# Patient Record
Sex: FEMALE | Race: ASIAN | NOT HISPANIC OR LATINO | URBAN - METROPOLITAN AREA
[De-identification: names, ages, dates, MRNs, and addresses within clinical notes are randomized per-mention and may not be internally consistent; named-entity substitution may affect disease eponyms.]

---

## 2024-09-08 ENCOUNTER — EMERGENCY (EMERGENCY)
Facility: HOSPITAL | Age: 54
LOS: 1 days | Discharge: HOME | End: 2024-09-08
Attending: STUDENT IN AN ORGANIZED HEALTH CARE EDUCATION/TRAINING PROGRAM
Payer: COMMERCIAL

## 2024-09-08 VITALS
WEIGHT: 147.71 LBS | DIASTOLIC BLOOD PRESSURE: 78 MMHG | RESPIRATION RATE: 17 BRPM | HEART RATE: 67 BPM | TEMPERATURE: 98 F | HEIGHT: 61.42 IN | SYSTOLIC BLOOD PRESSURE: 151 MMHG | OXYGEN SATURATION: 98 %

## 2024-09-08 VITALS
TEMPERATURE: 98 F | RESPIRATION RATE: 16 BRPM | SYSTOLIC BLOOD PRESSURE: 114 MMHG | HEART RATE: 59 BPM | DIASTOLIC BLOOD PRESSURE: 56 MMHG | OXYGEN SATURATION: 96 %

## 2024-09-08 LAB
ALBUMIN SERPL ELPH-MCNC: 4.3 G/DL — SIGNIFICANT CHANGE UP (ref 3.3–5)
ALP SERPL-CCNC: 102 U/L — SIGNIFICANT CHANGE UP (ref 40–120)
ALT FLD-CCNC: 36 U/L — SIGNIFICANT CHANGE UP (ref 10–45)
ANION GAP SERPL CALC-SCNC: 13 MMOL/L — SIGNIFICANT CHANGE UP (ref 5–17)
APTT BLD: 40.2 SEC — HIGH (ref 24.5–35.6)
AST SERPL-CCNC: 24 U/L — SIGNIFICANT CHANGE UP (ref 10–40)
BASOPHILS # BLD AUTO: 0.05 K/UL — SIGNIFICANT CHANGE UP (ref 0–0.2)
BASOPHILS NFR BLD AUTO: 0.4 % — SIGNIFICANT CHANGE UP (ref 0–2)
BILIRUB SERPL-MCNC: 1.5 MG/DL — HIGH (ref 0.2–1.2)
BUN SERPL-MCNC: 12 MG/DL — SIGNIFICANT CHANGE UP (ref 7–23)
CALCIUM SERPL-MCNC: 9.8 MG/DL — SIGNIFICANT CHANGE UP (ref 8.4–10.5)
CHLORIDE SERPL-SCNC: 104 MMOL/L — SIGNIFICANT CHANGE UP (ref 96–108)
CO2 SERPL-SCNC: 24 MMOL/L — SIGNIFICANT CHANGE UP (ref 22–31)
CREAT SERPL-MCNC: 0.61 MG/DL — SIGNIFICANT CHANGE UP (ref 0.5–1.3)
EGFR: 106 ML/MIN/1.73M2 — SIGNIFICANT CHANGE UP
EOSINOPHIL # BLD AUTO: 0.13 K/UL — SIGNIFICANT CHANGE UP (ref 0–0.5)
EOSINOPHIL NFR BLD AUTO: 1.1 % — SIGNIFICANT CHANGE UP (ref 0–6)
GLUCOSE SERPL-MCNC: 197 MG/DL — HIGH (ref 70–99)
HCG SERPL-ACNC: <2 MIU/ML — SIGNIFICANT CHANGE UP
HCT VFR BLD CALC: 45.3 % — HIGH (ref 34.5–45)
HGB BLD-MCNC: 14.9 G/DL — SIGNIFICANT CHANGE UP (ref 11.5–15.5)
IMM GRANULOCYTES NFR BLD AUTO: 0.3 % — SIGNIFICANT CHANGE UP (ref 0–0.9)
INR BLD: 1.76 RATIO — HIGH (ref 0.85–1.18)
LYMPHOCYTES # BLD AUTO: 2.74 K/UL — SIGNIFICANT CHANGE UP (ref 1–3.3)
LYMPHOCYTES # BLD AUTO: 22.6 % — SIGNIFICANT CHANGE UP (ref 13–44)
MCHC RBC-ENTMCNC: 27.6 PG — SIGNIFICANT CHANGE UP (ref 27–34)
MCHC RBC-ENTMCNC: 32.9 GM/DL — SIGNIFICANT CHANGE UP (ref 32–36)
MCV RBC AUTO: 84 FL — SIGNIFICANT CHANGE UP (ref 80–100)
MONOCYTES # BLD AUTO: 0.73 K/UL — SIGNIFICANT CHANGE UP (ref 0–0.9)
MONOCYTES NFR BLD AUTO: 6 % — SIGNIFICANT CHANGE UP (ref 2–14)
NEUTROPHILS # BLD AUTO: 8.42 K/UL — HIGH (ref 1.8–7.4)
NEUTROPHILS NFR BLD AUTO: 69.6 % — SIGNIFICANT CHANGE UP (ref 43–77)
NRBC # BLD: 0 /100 WBCS — SIGNIFICANT CHANGE UP (ref 0–0)
PLATELET # BLD AUTO: 174 K/UL — SIGNIFICANT CHANGE UP (ref 150–400)
POTASSIUM SERPL-MCNC: 3.7 MMOL/L — SIGNIFICANT CHANGE UP (ref 3.5–5.3)
POTASSIUM SERPL-SCNC: 3.7 MMOL/L — SIGNIFICANT CHANGE UP (ref 3.5–5.3)
PROT SERPL-MCNC: 7.8 G/DL — SIGNIFICANT CHANGE UP (ref 6–8.3)
PROTHROM AB SERPL-ACNC: 18.2 SEC — HIGH (ref 9.5–13)
RBC # BLD: 5.39 M/UL — HIGH (ref 3.8–5.2)
RBC # FLD: 14.4 % — SIGNIFICANT CHANGE UP (ref 10.3–14.5)
SODIUM SERPL-SCNC: 141 MMOL/L — SIGNIFICANT CHANGE UP (ref 135–145)
TROPONIN T, HIGH SENSITIVITY RESULT: <6 NG/L — SIGNIFICANT CHANGE UP (ref 0–51)
WBC # BLD: 12.11 K/UL — HIGH (ref 3.8–10.5)
WBC # FLD AUTO: 12.11 K/UL — HIGH (ref 3.8–10.5)

## 2024-09-08 PROCEDURE — 36415 COLL VENOUS BLD VENIPUNCTURE: CPT

## 2024-09-08 PROCEDURE — 82803 BLOOD GASES ANY COMBINATION: CPT

## 2024-09-08 PROCEDURE — 0042T: CPT | Mod: MC

## 2024-09-08 PROCEDURE — 82435 ASSAY OF BLOOD CHLORIDE: CPT

## 2024-09-08 PROCEDURE — 93005 ELECTROCARDIOGRAM TRACING: CPT

## 2024-09-08 PROCEDURE — 70496 CT ANGIOGRAPHY HEAD: CPT | Mod: 26,MC

## 2024-09-08 PROCEDURE — 70496 CT ANGIOGRAPHY HEAD: CPT | Mod: MC

## 2024-09-08 PROCEDURE — 36000 PLACE NEEDLE IN VEIN: CPT | Mod: XU

## 2024-09-08 PROCEDURE — 70498 CT ANGIOGRAPHY NECK: CPT | Mod: MC

## 2024-09-08 PROCEDURE — 85025 COMPLETE CBC W/AUTO DIFF WBC: CPT

## 2024-09-08 PROCEDURE — 85018 HEMOGLOBIN: CPT

## 2024-09-08 PROCEDURE — 99285 EMERGENCY DEPT VISIT HI MDM: CPT

## 2024-09-08 PROCEDURE — 85014 HEMATOCRIT: CPT

## 2024-09-08 PROCEDURE — 99285 EMERGENCY DEPT VISIT HI MDM: CPT | Mod: 25

## 2024-09-08 PROCEDURE — 82330 ASSAY OF CALCIUM: CPT

## 2024-09-08 PROCEDURE — 85610 PROTHROMBIN TIME: CPT

## 2024-09-08 PROCEDURE — 85730 THROMBOPLASTIN TIME PARTIAL: CPT

## 2024-09-08 PROCEDURE — 82947 ASSAY GLUCOSE BLOOD QUANT: CPT

## 2024-09-08 PROCEDURE — 70450 CT HEAD/BRAIN W/O DYE: CPT | Mod: MC

## 2024-09-08 PROCEDURE — 83605 ASSAY OF LACTIC ACID: CPT

## 2024-09-08 PROCEDURE — 84484 ASSAY OF TROPONIN QUANT: CPT

## 2024-09-08 PROCEDURE — 82962 GLUCOSE BLOOD TEST: CPT

## 2024-09-08 PROCEDURE — 80053 COMPREHEN METABOLIC PANEL: CPT

## 2024-09-08 PROCEDURE — 70450 CT HEAD/BRAIN W/O DYE: CPT | Mod: 26,MC

## 2024-09-08 PROCEDURE — 84295 ASSAY OF SERUM SODIUM: CPT

## 2024-09-08 PROCEDURE — 84132 ASSAY OF SERUM POTASSIUM: CPT

## 2024-09-08 PROCEDURE — 84702 CHORIONIC GONADOTROPIN TEST: CPT

## 2024-09-08 PROCEDURE — 70498 CT ANGIOGRAPHY NECK: CPT | Mod: 26,MC

## 2024-09-08 NOTE — ED PROVIDER NOTE - OBJECTIVE STATEMENT
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  Patient is accompanied by son who states that patient came to visit her from [place].  Patient endorses right eye blurriness that started around 2 PM yesterday.  Patient states she was having difficulty walking due to being unable to see fully this morning.  Patient denies missing any doses of her warfarin.  Patient denies headaches, neck pain, back pain, chest pain, trouble breathing, abdominal pain, nausea/vomiting, diarrhea/constipation, dysuria, hematuria, numbness or tingling or weakness in the extremities.

## 2024-09-08 NOTE — ED PROVIDER NOTE - PHYSICAL EXAMINATION
Constitutional: VS reviewed. Alert and orientedx3, well appearing, no apparent distress  HEENT: Atraumatic, EOMI, PERRL, + L hemianopsia in both eyesv(known to pt), R eye blurry vision able to see movement but not finger numbers  CV: RRR  Lungs: Clear and equal bilaterally, no wheezes, rales or crackles  Abdomen: Soft, nondistended, nontender  MSK: No deformities  Skin: Warm and dry. As visualized no rashes, lesions, bruising or erythema  Neuro: Strength 5/5 in all extremities. Sensation intact. No pronator drift, facial droop or slurred speech.  Steady gait.   Lymph: No pitting edema in extremities.

## 2024-09-08 NOTE — CONSULT NOTE ADULT - SUBJECTIVE AND OBJECTIVE BOX
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  Patient is accompanied by son who states that patient came to visit her from [place].  Patient endorses right eye blurriness that started around 2 PM yesterday.  Patient states she was having difficulty walking due to being unable to see fully this morning.  Patient denies missing any doses of her warfarin.  Patient denies headaches, neck pain, back pain, chest pain, trouble breathing, abdominal pain, nausea/vomiting, diarrhea/constipation, dysuria, hematuria, numbness or tingling or weakness in the extremities.  [facility] OPHTHALMOLOGY - INITIAL ADULT CONSULT  -----------------------------------------------------------------------------  >>> <<<>>> <<<  Contact: TEAMS  -----------------------------------------------------------------------------    HPI:    Interval History: patient reports at 10 am yesterday she started having episode of blurry vision, acutely worsened overnight which prompted her to be seen in Ed. Denies headache, jaw claudication scalp tenderness, polymyalgia rheumatica.     PMH: as above   POcHx: "vascular event" OS 2008 with residual APD and poor vision   FH: denies glc/amd  Social History: denies etoh/tobacco  Ophthalmic Medications: none  Allergies: NKDA    Review of Systems:  Constitutional: No fever, chills  Eyes: No blurry vision, flashes, floaters, FBS, erythema, discharge, double vision, OU  Neuro: No tremors  Cardiovascular: No chest pain, palpitations  Respiratory: No SOB, no cough  GI: No nausea, vomiting, abdominal pain  : No dysuria  Skin: no rash  Psych: no depression  Endocrine: no polyuria, polydipsia  Heme/lymph: no swelling    VITALS: T(C): 36.6 (09-08-24 @ 10:04)  T(F): 97.8 (09-08-24 @ 10:04), Max: 97.8 (09-08-24 @ 10:04)  HR: 59 (09-08-24 @ 10:04) (59 - 67)  BP: 114/56 (09-08-24 @ 10:04) (114/56 - 151/78)  RR:  (16 - 17)  SpO2:  (96% - 98%)  Wt(kg): --  General: AAO x 3, appropriate mood and affect    Ophthalmology Exam:  Visual acuity (cc): 20/20 OD, 20/200 PH 20/70 OS  Pupils: APD OS  Ttono: 12 OD 14 OS  Extraocular movements (EOMs): Full OU, no pain, no diplopia  Confrontational Visual Field (CVF): Full OD, temporal restriction OS  Color Plates: 12/12 OD, 8/12 OS    Pen Light Exam (PLE)  External: Flat OU  Lids/Lashes/Lacrimal Ducts: Flat OU    Sclera/Conjunctiva: W+Q OU  Cornea: Cl OU  Anterior Chamber: D+F OU    Iris: Flat OU  Lens: Cl OU    Fundus Exam: dilated with 1% tropicamide and 2.5% phenylephrine  Approval obtained from primary team for dilation  Patient aware that pupils can remained dilated for at least 4-6 hours  Exam performed with 20D lens    Vitreous: wnl OU  Disc, cup/disc: sharp and pink, 0.2 OU  Macula: Flat OU  Vessels: Normal caliber, no evidence of vascular occlusion OU  Periphery: flat without tears holes or breaksOU    Labs/Imaging:  *** [facility] DEPARTMENT OF OPHTHALMOLOGY - INITIAL ADULT CONSULT  -----------------------------------------------------------------------------  >>> <<<>>> <<<  Contact: TEAMS  -----------------------------------------------------------------------------    HPI: · HPI Objective Statement: 54-year-old female with past medical history of mechanical valve replacement on warfarin, 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  Patient is accompanied by son who states that patient came to visit her from [place].  Patient endorses right eye blurriness that started around 2 PM yesterday.  Patient states she was having difficulty walking due to being unable to see fully this morning.  Patient denies missing any doses of her warfarin.  Patient denies headaches, neck pain, back pain, chest pain, trouble breathing, abdominal pain, nausea/vomiting, diarrhea/constipation, dysuria, hematuria, numbness or tingling or weakness in the extremities.    Interval History: patient reports at 10 am yesterday she started having episode of blurry vision, acutely worsened overnight which prompted her to be seen in Ed. Denies headache, jaw claudication scalp tenderness, polymyalgia rheumatica.     PMH: as above   POcHx: "vascular event" OS 2008 with residual APD and poor vision   FH: denies glc/amd  Social History: denies etoh/tobacco  Ophthalmic Medications: none  Allergies: NKDA    Review of Systems:  Constitutional: No fever, chills  Eyes: No blurry vision, flashes, floaters, FBS, erythema, discharge, double vision, OU  Neuro: No tremors  Cardiovascular: No chest pain, palpitations  Respiratory: No SOB, no cough  GI: No nausea, vomiting, abdominal pain  : No dysuria  Skin: no rash  Psych: no depression  Endocrine: no polyuria, polydipsia  Heme/lymph: no swelling    VITALS: T(C): 36.6 (09-08-24 @ 10:04)  T(F): 97.8 (09-08-24 @ 10:04), Max: 97.8 (09-08-24 @ 10:04)  HR: 59 (09-08-24 @ 10:04) (59 - 67)  BP: 114/56 (09-08-24 @ 10:04) (114/56 - 151/78)  RR:  (16 - 17)  SpO2:  (96% - 98%)  Wt(kg): --  General: AAO x 3, appropriate mood and affect    Ophthalmology Exam:  Visual acuity (cc): 20/20 OD, 20/200 PH 20/70 OS  Pupils: APD OS  Ttono: 12 OD 14 OS  Extraocular movements (EOMs): Full OU, no pain, no diplopia  Confrontational Visual Field (CVF): Full OD, temporal restriction OS  Color Plates: 12/12 OD, 8/12 OS    Pen Light Exam (PLE)  External: Flat OU  Lids/Lashes/Lacrimal Ducts: Flat OU    Sclera/Conjunctiva: W+Q OU  Cornea: Cl OU  Anterior Chamber: D+F OU    Iris: Flat OU  Lens: Cl OU    Fundus Exam: dilated with 1% tropicamide and 2.5% phenylephrine  Approval obtained from primary team for dilation  Patient aware that pupils can remained dilated for at least 4-6 hours  Exam performed with 20D lens    Vitreous: wnl OU  Disc, cup/disc: sharp and pink, 0.2 OU  Macula: Flat OU  Vessels: Normal caliber, no evidence of vascular occlusion OU  Periphery: flat without tears holes or breaksOU    Labs/Imaging:  ***

## 2024-09-08 NOTE — CONSULT NOTE ADULT - SUBJECTIVE AND OBJECTIVE BOX
Neurology - Consult Note    -  Spectra: [phone] ([facility]), [phone] ([facility])  -    HPI: Patient ZION AJ is a 54y (1970) woman with a PMHx significant for Mechanical valve replacement (warfarin), Stroke left eye (2018, residual vision loss) presenting as code stroke for right eye blurry vision since yesterday. Patient landed       NIHSS 2  mRS 0  LKN      Review of Systems:    All other review of systems is negative unless indicated above.    Allergies:  No Known Allergies      PMHx/PSHx/Family Hx: As above, otherwise see below       Social Hx:  No current use of tobacco, alcohol, or illicit drugs      Medications:  MEDICATIONS  (STANDING):    MEDICATIONS  (PRN):      Vitals:  T(C): 36.5 (09-08-24 @ 08:13), Max: 36.5 (09-08-24 @ 08:13)  HR: 67 (09-08-24 @ 08:13) (67 - 67)  BP: 151/78 (09-08-24 @ 08:13) (151/78 - 151/78)  RR: 17 (09-08-24 @ 08:13) (17 - 17)  SpO2: 98% (09-08-24 @ 08:13) (98% - 98%)    Physical Examination: INCOMPLETE  General - NAD  Cardiovascular - Peripheral pulses palpable, no edema  Eyes - Fundoscopy with flat, sharp optic discs and no hemorrhage or exudates; Fundoscopy not well visualized; Fundoscopy not performed due to safety precautions in the setting of the COVID-19 pandemic    Neurologic Exam:  Mental status - Awake, Alert, Oriented to person, place, and time. Speech fluent, repetition and naming intact. Follows simple and complex commands. Attention/concentration, recent and remote memory (including registration and recall), and fund of knowledge intact    Cranial nerves - PERRLA, VFF, EOMI, face sensation (V1-V3) intact b/l, facial strength intact without asymmetry b/l, hearing intact b/l, palate with symmetric elevation, trapezius OR sternocleidomastiod 5/5 strength b/l, tongue midline on protrusion with full lateral movement    Motor - Normal bulk and tone throughout. No pronator drift.  Strength testing            Deltoid      Biceps      Triceps     Wrist Extension    Wrist Flexion     Interossei         R            5                 5               5                     5                              5                        5                 5  L             5                 5               5                     5                              5                        5                 5              Hip Flexion    Hip Extension    Knee Flexion    Knee Extension    Dorsiflexion    Plantar Flexion  R              5                           5                       5                           5                            5                          5  L              5                           5                        5                           5                            5                          5    Sensation - Light touch/temperature OR pain/vibration intact throughout    DTR's -             Biceps      Triceps     Brachioradialis      Patellar    Ankle    Toes/plantar response  R             2+             2+                  2+                       2+            2+                 Down  L              2+             2+                 2+                        2+           2+                 Down    Coordination - Finger to Nose intact b/l. No tremors appreciated    Gait and station - Normal casual gait. 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 Patient visiting from [place] and landed yesterday around 2 pm. She reported blurry vision to entire right visual field. Vision loss progressivley worsened.       NIHSS 2  mRS 0  LKN 11 am 9/7      Review of Systems:    All other review of systems is negative unless indicated above.    Allergies:  No Known Allergies      PMHx/PSHx/Family Hx: As above, otherwise see below       Social Hx:  No current use of tobacco, alcohol, or illicit drugs      Medications:  MEDICATIONS  (STANDING):    MEDICATIONS  (PRN):      Vitals:  T(C): 36.5 (09-08-24 @ 08:13), Max: 36.5 (09-08-24 @ 08:13)  HR: 67 (09-08-24 @ 08:13) (67 - 67)  BP: 151/78 (09-08-24 @ 08:13) (151/78 - 151/78)  RR: 17 (09-08-24 @ 08:13) (17 - 17)  SpO2: 98% (09-08-24 @ 08:13) (98% - 98%)    Physical Examination: INCOMPLETE  General - NAD  Cardiovascular - Peripheral pulses palpable, no edema  Eyes - Fundoscopy with flat, sharp optic discs and no hemorrhage or exudates; Fundoscopy not well visualized; Fundoscopy not performed due to safety precautions in the setting of the COVID-19 pandemic    Neurologic Exam:  Mental status - Awake, Alert, Oriented to person, place, and time. Speech fluent, repetition and naming intact. Follows simple and complex commands. Attention/concentration, recent and remote memory (including registration and recall), and fund of knowledge intact    Cranial nerves - PERRLA, VFF, EOMI, face sensation (V1-V3) intact b/l, facial strength intact without asymmetry b/l, hearing intact b/l, palate with symmetric elevation, trapezius OR sternocleidomastiod 5/5 strength b/l, tongue midline on protrusion with full lateral movement    Motor - Normal bulk and tone throughout. No pronator drift.  Strength testing            Deltoid      Biceps      Triceps     Wrist Extension    Wrist Flexion     Interossei         R            5                 5               5                     5                              5                        5                 5  L             5                 5               5                     5                              5                        5                 5              Hip Flexion    Hip Extension    Knee Flexion    Knee Extension    Dorsiflexion    Plantar Flexion  R              5                           5                       5                           5                            5                          5  L              5                           5                        5                           5                            5                          5    Sensation - Light touch/temperature OR pain/vibration intact throughout    DTR's -             Biceps      Triceps     Brachioradialis      Patellar    Ankle    Toes/plantar response  R             2+             2+                  2+                       2+            2+                 Down  L              2+             2+                 2+                        2+           2+                 Down    Coordination - Finger to Nose intact b/l. No tremors appreciated    Gait and station - Normal casual gait. Romberg (-)    Labs:          CAPILLARY BLOOD GLUCOSE      POCT Blood Glucose.: 199 mg/dL (08 Sep 2024 08:17)          Radiology:    < from: CT Brain Stroke Protocol (09.08.24 @ 08:40) >  No acute intracranial hemorrhage or focal loss of the gray matter white   matter junction is appreciated. If the patient has a new and persistent   neurologic deficit, consider short interval follow-up head CT or brain   MRI follow-up.    A 2.8 cm CSF density cystic focus involves the right lateral temporal   region with a suggestion of slight scalloping of the inner table of the   calvarium; an arachnoid cyst, cystic encephalomalacia, less likely   epidermoid cyst are some considerations in the differential diagnosis.    A 6 mm calcification involves the right parietal cortex without   surrounding edema. A dystrophic calcification,   postinfectious-inflammatory calcification (such as neurocysticercosis) or   a cavernous malformation are some considerations in the differential   diagnosis. Consider eventual follow-up brain MRI with and without   contrast for further workup if clinically warranted.    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 Patient visiting from [place] and landed yesterday around 2 pm. She reported blurry vision to entire right visual field. Vision loss was persistent till this morning.       NIHSS 2  mRS 0  LKN 11 am 9/7      Review of Systems:    All other review of systems is negative unless indicated above.    Allergies:  No Known Allergies      PMHx/PSHx/Family Hx: As above, otherwise see below       Social Hx:  No current use of tobacco, alcohol, or illicit drugs      Medications:  MEDICATIONS  (STANDING):    MEDICATIONS  (PRN):      Vitals:  T(C): 36.5 (09-08-24 @ 08:13), Max: 36.5 (09-08-24 @ 08:13)  HR: 67 (09-08-24 @ 08:13) (67 - 67)  BP: 151/78 (09-08-24 @ 08:13) (151/78 - 151/78)  RR: 17 (09-08-24 @ 08:13) (17 - 17)  SpO2: 98% (09-08-24 @ 08:13) (98% - 98%)    Physical Examination:   General - NAD  Eyes: Visual acuity right eye 20/100  rAPD left eye    Neurologic Exam:  Mental status - Awake, Alert, Oriented to person, place, and time. Speech fluent, repetition and naming intact. Follows simple and complex commands. Attention/concentration, recent and remote memory (including registration and recall), and fund of knowledge intact    Cranial nerves - PERRL, Can see fingers moving right eye, but unable to distinguish between fingers, left eye nasal visual field worse than temporal, EOMI, face sensation (V1-V3) intact b/l, facial strength intact without asymmetry b/l, hearing intact b/l, palate with symmetric elevation, trapezius 5/5 strength b/l, tongue midline on protrusion with full lateral movement    Motor - Normal bulk and tone throughout. No pronator drift.  Strength testing            Deltoid      Biceps      Triceps     Wrist Extension    Wrist Flexion            R            5                 5               5                     5                              5                        5                   L             5                 5               5                     5                              5                        5                               Hip Flexion      Knee Flexion    Knee Extension    Dorsiflexion    Plantar Flexion  R              5                           5                       5                           5                            5                            L              5                           5                        5                           5                            5                              Sensation - Light touch intact throughout    Coordination - Finger to Nose intact b/l. No tremors appreciated    Gait and station - Normal casual gait.     Labs:          CAPILLARY BLOOD GLUCOSE      POCT Blood Glucose.: 199 mg/dL (08 Sep 2024 08:17)          Radiology:    < from: CT Brain Stroke Protocol (09.08.24 @ 08:40) >  No acute intracranial hemorrhage or focal loss of the gray matter white   matter junction is appreciated. If the patient has a new and persistent   neurologic deficit, consider short interval follow-up head CT or brain   MRI follow-up.    A 2.8 cm CSF density cystic focus involves the right lateral temporal   region with a suggestion of slight scalloping of the inner table of the   calvarium; an arachnoid cyst, cystic encephalomalacia, less likely   epidermoid cyst are some considerations in the differential diagnosis.    A 6 mm calcification involves the right parietal cortex without   surrounding edema. A dystrophic calcification,   postinfectious-inflammatory calcification (such as neurocysticercosis) or   a cavernous malformation are some considerations in the differential   diagnosis. Consider eventual follow-up brain MRI with and without   contrast for further workup if clinically warranted.     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 Patient visiting from [place] and landed yesterday around 2 pm. She reported blurry vision to entire right visual field. Vision loss was persistent till this morning.       NIHSS 2  mRS 0  LKN 11 am 9/7      Review of Systems:    All other review of systems is negative unless indicated above.    Allergies:  No Known Allergies      PMHx/PSHx/Family Hx: As above, otherwise see below       Social Hx:  No current use of tobacco, alcohol, or illicit drugs      Medications:  MEDICATIONS  (STANDING):    MEDICATIONS  (PRN):      Vitals:  T(C): 36.5 (09-08-24 @ 08:13), Max: 36.5 (09-08-24 @ 08:13)  HR: 67 (09-08-24 @ 08:13) (67 - 67)  BP: 151/78 (09-08-24 @ 08:13) (151/78 - 151/78)  RR: 17 (09-08-24 @ 08:13) (17 - 17)  SpO2: 98% (09-08-24 @ 08:13) (98% - 98%)    Physical Examination:   General - NAD  Eyes: Visual acuity right eye 20/100  rAPD left eye    Neurologic Exam:  Mental status - Awake, Alert, Oriented to person, place, and time. Speech fluent, repetition and naming intact. Follows simple and complex commands. Attention/concentration, recent and remote memory (including registration and recall), and fund of knowledge intact    Cranial nerves - PERRL, Can see fingers moving right eye, but unable to distinguish between fingers, left eye nasal visual field worse than temporal, EOMI, face sensation (V1-V3) intact b/l, facial strength intact without asymmetry b/l, hearing intact b/l, palate with symmetric elevation, trapezius 5/5 strength b/l, tongue midline on protrusion with full lateral movement    Motor - Normal bulk and tone throughout. No pronator drift.  Strength testing            Deltoid      Biceps      Triceps     Wrist Extension    Wrist Flexion            R            5                 5               5                     5                              5                        5                   L             5                 5               5                     5                              5                        5                               Hip Flexion      Knee Flexion    Knee Extension    Dorsiflexion    Plantar Flexion  R              5                           5                       5                           5                            5                            L              5                           5                        5                           5                            5                              Sensation - Light touch intact throughout    Coordination - Finger to Nose intact b/l. No tremors appreciated    Gait and station - Normal casual gait.     Labs:          CAPILLARY BLOOD GLUCOSE      POCT Blood Glucose.: 199 mg/dL (08 Sep 2024 08:17)          Radiology:    < from: CT Brain Stroke Protocol (09.08.24 @ 08:40) >  No acute intracranial hemorrhage or focal loss of the gray matter white   matter junction is appreciated. If the patient has a new and persistent   neurologic deficit, consider short interval follow-up head CT or brain   MRI follow-up.    A 2.8 cm CSF density cystic focus involves the right lateral temporal   region with a suggestion of slight scalloping of the inner table of the   calvarium; an arachnoid cyst, cystic encephalomalacia, less likely   epidermoid cyst are some considerations in the differential diagnosis.    A 6 mm calcification involves the right parietal cortex without   surrounding edema. A dystrophic calcification,   postinfectious-inflammatory calcification (such as neurocysticercosis) or   a cavernous malformation are some considerations in the differential   diagnosis. Consider eventual follow-up brain MRI with and without   contrast for further workup if clinically warranted.    CT PERFUSION:    Technical limitations: None.    A perfusion defect is noted over the right lateral temporal cystic focus   (outside the brain parenchyma). No brain parenchymal perfusion defects   are noted.    If the patient has new and persistent unexplained right eye blurry   vision, consider follow-up brain and orbital MRI for further workup.    Penumbra / tissue at risk for active ischemia: 0 ml    CTA NECK:    No evidence of significant stenosis or occlusion.    There are multiple thyroid cysts, some of which contain some isodense   material within. Consider eventual correlation with thyroid ultrasound if   clinically warranted.       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 Patient visiting from [place] and landed yesterday around 2 pm. She reported blurry vision to entire right visual field. Vision loss was persistent till this morning.       NIHSS 2  mRS 0  LKN 11 am 9/7      Review of Systems:    All other review of systems is negative unless indicated above.  Denies jaw claudication, temporal pain, weightloss, fever.    Allergies:  No Known Allergies      PMHx/PSHx/Family Hx: As above, otherwise see below       Social Hx:  No current use of tobacco, alcohol, or illicit drugs      Medications:  MEDICATIONS  (STANDING):    MEDICATIONS  (PRN):      Vitals:  T(C): 36.5 (09-08-24 @ 08:13), Max: 36.5 (09-08-24 @ 08:13)  HR: 67 (09-08-24 @ 08:13) (67 - 67)  BP: 151/78 (09-08-24 @ 08:13) (151/78 - 151/78)  RR: 17 (09-08-24 @ 08:13) (17 - 17)  SpO2: 98% (09-08-24 @ 08:13) (98% - 98%)    Physical Examination:   General - NAD  Eyes: Visual acuity right eye 20/100  rAPD left eye  Normal temporal pulses    Neurologic Exam:  Mental status - Awake, Alert, Oriented to person, place, and time. Speech fluent, repetition and naming intact. Follows simple and complex commands.     Cranial nerves - PERRL, Can see fingers moving right eye, but unable to distinguish between fingers, left eye nasal visual field worse than temporal, EOMI, face sensation (V1-V3) intact b/l, facial strength intact without asymmetry b/l, hearing intact b/l, palate with symmetric elevation, trapezius 5/5 strength b/l, tongue midline on protrusion with full lateral movement    Motor - Normal bulk and tone throughout. No pronator drift.  Strength testing            Deltoid      Biceps      Triceps     Wrist Extension    Wrist Flexion            R            5                 5               5                     5                              5                        5                   L             5                 5               5                     5                              5                        5                               Hip Flexion      Knee Flexion    Knee Extension    Dorsiflexion    Plantar Flexion  R              5                           5                       5                           5                            5                            L              5                           5                        5                           5                            5                              Sensation - Light touch intact throughout    Coordination - Finger to Nose intact b/l. No tremors appreciated    Gait and station - Normal casual gait.     Labs:          CAPILLARY BLOOD GLUCOSE      POCT Blood Glucose.: 199 mg/dL (08 Sep 2024 08:17)          Radiology:    < from: CT Brain Stroke Protocol (09.08.24 @ 08:40) >  No acute intracranial hemorrhage or focal loss of the gray matter white   matter junction is appreciated. If the patient has a new and persistent   neurologic deficit, consider short interval follow-up head CT or brain   MRI follow-up.    A 2.8 cm CSF density cystic focus involves the right lateral temporal   region with a suggestion of slight scalloping of the inner table of the   calvarium; an arachnoid cyst, cystic encephalomalacia, less likely   epidermoid cyst are some considerations in the differential diagnosis.    A 6 mm calcification involves the right parietal cortex without   surrounding edema. A dystrophic calcification,   postinfectious-inflammatory calcification (such as neurocysticercosis) or   a cavernous malformation are some considerations in the differential   diagnosis. Consider eventual follow-up brain MRI with and without   contrast for further workup if clinically warranted.    CT PERFUSION:    Technical limitations: None.    A perfusion defect is noted over the right lateral temporal cystic focus   (outside the brain parenchyma). No brain parenchymal perfusion defects   are noted.    If the patient has new and persistent unexplained right eye blurry   vision, consider follow-up brain and orbital MRI for further workup.    Penumbra / tissue at risk for active ischemia: 0 ml    CTA NECK:    No evidence of significant stenosis or occlusion.    There are multiple thyroid cysts, some of which contain some isodense   material within. Consider eventual correlation with thyroid ultrasound if   clinically warranted.       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 Patient visiting from [place] and landed yesterday around 2 pm. She reported blurry vision to entire right visual field. Vision loss was persistent till this morning. while coming to the ER, vision has improved. Patient reports having similar symptoms in the past that self-resolved. She follows with a ophthalmologist and neurologist in [place]. Denies headache, temporal tenderness, jaw claudications, fever, weightloss, changes to speech, weakness, numbness, difficulty walking, dizziness. [employer]  used.      NIHSS 2  mRS 0  LKN 11 am 9/7      Review of Systems:    All other review of systems is negative unless indicated above.    Allergies:  No Known Allergies      PMHx/PSHx/Family Hx: As above, otherwise see below       Social Hx:  No current use of tobacco, alcohol, or illicit drugs      Medications:  MEDICATIONS  (STANDING):    MEDICATIONS  (PRN):      Vitals:  T(C): 36.5 (09-08-24 @ 08:13), Max: 36.5 (09-08-24 @ 08:13)  HR: 67 (09-08-24 @ 08:13) (67 - 67)  BP: 151/78 (09-08-24 @ 08:13) (151/78 - 151/78)  RR: 17 (09-08-24 @ 08:13) (17 - 17)  SpO2: 98% (09-08-24 @ 08:13) (98% - 98%)    Physical Examination:   General - NAD  Eyes: Visual acuity right eye 20/100  rAPD left eye  Normal temporal pulses    Neurologic Exam:  Mental status - Awake, Alert, Oriented to person, place, and time. Speech fluent, repetition and naming intact. Follows simple and complex commands.     Cranial nerves - PERRL, Can see fingers moving right eye, but unable to distinguish between fingers, left eye nasal visual field worse than temporal, EOMI, face sensation (V1-V3) intact b/l, facial strength intact without asymmetry b/l, hearing intact b/l, palate with symmetric elevation, trapezius 5/5 strength b/l, tongue midline on protrusion with full lateral movement    Motor - Normal bulk and tone throughout. No pronator drift.  Strength testing            Deltoid      Biceps      Triceps     Wrist Extension    Wrist Flexion            R            5                 5               5                     5                              5                        5                   L             5                 5               5                     5                              5                        5                               Hip Flexion      Knee Flexion    Knee Extension    Dorsiflexion    Plantar Flexion  R              5                           5                       5                           5                            5                            L              5                           5                        5                           5                            5                              Sensation - Light touch intact throughout    Coordination - Finger to Nose intact b/l. No tremors appreciated    Gait and station - Normal casual gait.     Labs:          CAPILLARY BLOOD GLUCOSE      POCT Blood Glucose.: 199 mg/dL (08 Sep 2024 08:17)          Radiology:    < from: CT Brain Stroke Protocol (09.08.24 @ 08:40) >  No acute intracranial hemorrhage or focal loss of the gray matter white   matter junction is appreciated. If the patient has a new and persistent   neurologic deficit, consider short interval follow-up head CT or brain   MRI follow-up.    A 2.8 cm CSF density cystic focus involves the right lateral temporal   region with a suggestion of slight scalloping of the inner table of the   calvarium; an arachnoid cyst, cystic encephalomalacia, less likely   epidermoid cyst are some considerations in the differential diagnosis.    A 6 mm calcification involves the right parietal cortex without   surrounding edema. A dystrophic calcification,   postinfectious-inflammatory calcification (such as neurocysticercosis) or   a cavernous malformation are some considerations in the differential   diagnosis. Consider eventual follow-up brain MRI with and without   contrast for further workup if clinically warranted.    CT PERFUSION:    Technical limitations: None.    A perfusion defect is noted over the right lateral temporal cystic focus   (outside the brain parenchyma). No brain parenchymal perfusion defects   are noted.    If the patient has new and persistent unexplained right eye blurry   vision, consider follow-up brain and orbital MRI for further workup.    Penumbra / tissue at risk for active ischemia: 0 ml    CTA NECK:    No evidence of significant stenosis or occlusion.    There are multiple thyroid cysts, some of which contain some isodense   material within. Consider eventual correlation with thyroid ultrasound if   clinically warranted.

## 2024-09-08 NOTE — ED ADULT TRIAGE NOTE - BP NONINVASIVE SYSTOLIC (MM HG)
Headaches for past 2 weeks. Pain is left sided and moved to her left eye. Pressure. No nausea or vomiting. 151

## 2024-09-08 NOTE — CONSULT NOTE ADULT - ASSESSMENT
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 At the time of evaluation, patient vision 20/20 OD     #Blurry vision OD   - patient reporting blurry vision since 10am yesterday which was at one point "white haze" then acutely worsened overnight for patient feeling like everything was blurry OD   - VA cc at time of evaluation 20/20 OD, IOP 12/14, APD OS at baseline   - color plates full OD with 8/12 OS, CVF with temporal restriction OS (baseline)   - anterior exam unremarkable   - DFE without evidence of vascular event OD, unremarkable exam, no disc edema  - reviewed old oct mac w/ son, patient without macular edema following vascular event OS, flat good foveal contour OD.  No paperwork discussing type of vascular event OS in 2008.   - CTH no acute findings 6 mm calcification involves the right parietal cortex,  2.8 cm CSF density cystic focus involves the right lateral temporal possibly cystic encephalomalacia. CTP No brain parenchymal perfusion defects. CTA No evidence of significant stenosis or occlusion.  - GCA ROS negative, no scalp tenderness, no headache, no jaw claudication, temporal arteries full and palpable   - no contraindications to discharge from ophthalmic standpoint   - patient to follow up on Thursday in [facility] at 8:40am for visual field testing     DW Dr. Byrd     Outpatient Follow-up: Patient should follow-up with his/her ophthalmologist or with [facility] Department of Ophthalmology within 1 week of after discharge at:    [address]  [place] [address]  [phone]    Kimberly De La Torre MD, PGY-1   Contact: [employer] Teams

## 2024-09-08 NOTE — ED ADULT TRIAGE NOTE - CHIEF COMPLAINT QUOTE
eye vision changes, blurry vision in right eye since 3pm yesterday, no other deficits  brought back immediately for MD evaluation

## 2024-09-08 NOTE — ED PROVIDER NOTE - NSFOLLOWUPINSTRUCTIONS_ED_ALL_ED_FT
Please follow up with you PCP in the next 2 days  Please follow up with ophtho within the next 2 days    Please take medications as prescribed.    Return to the Emergency Department for worsening or persistent symptoms, and/or ANY NEW OR CONCERNING SYMPTOMS. If you have issues obtaining follow up, please call: [phone] or [phone]  to obtain a doctor or specialist who takes your insurance in your area.    Please return to the ED for any new or worsening problems including but not limited to: fever, chills, headache, chest pain, shortness of breath, palpitations, abdominal pain, nausea, vomiting, diarrhea, numbness or weakness.

## 2024-09-08 NOTE — ED PROVIDER NOTE - CARE PLAN
1 Principal Discharge DX:	Blurry vision, right eye  Secondary Diagnosis:	Thyroid nodule  Secondary Diagnosis:	Brain cyst

## 2024-09-08 NOTE — ED PROVIDER NOTE - NSFOLLOWUPCLINICS_GEN_ALL_ED_FT
[facility]  Ophthalmology  [address] [place] [address], [address]  [place] [address]  Phone: [phone]  Fax:   Follow Up Time: 1-3 Days    [facility] [place]  Internal Medicine  [address] [place] [address]  Phone: [phone]  Fax: [phone]  Follow Up Time: 1-3 Days

## 2024-09-08 NOTE — ED PROVIDER NOTE - PATIENT PORTAL LINK FT
You can access the FollowMyHealth Patient Portal offered by [facility] by registering at the following website: [email or web address]. By joining [employer]’s FollowMyHealth portal, you will also be able to view your health information using other applications (apps) compatible with our system.

## 2024-09-08 NOTE — ED ADULT NURSE NOTE - OBJECTIVE STATEMENT
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 Patient endorsing right sided visual loss starting at 2pm yesterday. Patient accompanied by son who translates Bogdan. Patient currently visiting from [place]. States she has been having difficulty walking d/t visual changes. Patient placed on cardiac monitor, NSR  Respirations clear and equal bilaterally. Abdomen soft, nontender and nondistended. Peripheral pulses strong and equal bilaterally. Pupils equal round and reactive to light. EOM intact. No slurred speech or facial droop noted. UE and LE sensation and motor intact.  IV placed and labs drawn. Safety and comfort measures maintained.

## 2024-09-08 NOTE — ED PROVIDER NOTE - DIFFERENTIAL DIAGNOSIS
ddx includes but not limited to: CVA vs TIA vs CRAO vs CRVO vs complex migraine Differential Diagnosis

## 2024-09-08 NOTE — ED PROVIDER NOTE - CLINICAL SUMMARY MEDICAL DECISION MAKING FREE TEXT BOX
54-year-old female with past medical history of mechanical valve replacement on warfarin, stroke in the left eye in 2018 with residual visual loss presents to the emergency department as code stroke for right eye vision changes since 2 PM yesterday. Pt has L eye issues at baseline with R hemianopsia in b/l eyes. + R blurry vision only able to identify finger movements but not number. No other focal neuro deficits. Plan for labs, CTs, ophtho eval. Dispo pending labs, imaging and ophtho eval. 54-year-old female with past medical history of mechanical valve replacement on warfarin, stroke in the left eye in 2018 with residual visual loss presents to the emergency department as code stroke for right eye vision changes since 2 PM yesterday. Pt has L eye issues at baseline with R hemianopsia in b/l eyes. + R blurry vision only able to identify finger movements but not number. No other focal neuro deficits. Painless vision loss. 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 Plan for labs, CTs, ophtho eval. Dispo pending labs, imaging and ophtho eval. 54-year-old female with past medical history of mechanical valve replacement on warfarin, stroke in the left eye in 2018 with residual visual loss presents to the emergency department as code stroke for right eye vision changes since 2 PM yesterday. Pt has L eye issues at baseline with R hemianopsia in b/l eyes. + R blurry vision only able to identify finger movements but not number. No other focal neuro deficits. Painless vision loss. 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 Plan for labs, CTs, ophtho eval. Dispo pending labs, imaging and ophtho eval.      reassessed patient. patient vision improved. no neuro deficits at this time. wants to go home.   discussed with optho, patient vision is now 20/20 in the R eye. does not suspect GCA, does not recommends ESR, CRP at this time. patient will follow up in their clinic on Thursday.  discussed with neuro, no need for acute hospitalization at this time.  patient was subtherapeutic with INR advised to continue taking and follow up this week with PCP locally

## 2024-09-08 NOTE — ED PROVIDER NOTE - ATTENDING CONTRIBUTION TO CARE
I have personally seen the patient with the Resident. I agree with their assessment and plan unless otherwise noted. See MDM

## 2024-09-08 NOTE — ED PROVIDER NOTE - PROGRESS NOTE DETAILS
Gladys Boykin PGY3: Neurology evaluated pt, pending final recs. Will consult ortho for new R visual deficits. Gladys Boykin PGY3: Neurology evaluated pt, pending final recs. Will consult ophtho for new R visual deficits. Gladys Boykin PGY3: Called ophtho first call resident x2, awaiting call back. Gladys Boykin PGY3: Neurology evaluated pt, pending final recs. Will consult ophtho for new R visual deficits. INR subtherapeutic for mechanical valve on coumadin.

## 2024-09-08 NOTE — CONSULT NOTE ADULT - ASSESSMENT
54y (1970) woman with a PMHx significant for Mechanical valve replacement 2000 (warfarin), Stroke left eye (2008 residual vision loss), HLD (lipitor 20) HTN, DM2 presenting as code stroke for right eye blurry vision since yesterday. Patient visiting from [place] and landed yesterday around 2 pm. She reported blurry vision to entire right visual field. Vision loss was persistent till this morning. On exam, left eye baseline left eye nasal visual field worse than temporal, right eye blurry vision throughout-20/100. CTH no acute findings 6 mm calcification involves the right parietal cortex,  2.8 cm CSF density cystic focus involves the right lateral temporal possibly cystic encephalomalacia. INR 1.76.     Not Tenecteplase candidate due to out of window      Impression: Fluctuating right eye blurry vision possibly due to possibly hyperglycemia vs hypertension vs possible stroke    Recommendation:  [] Follow final CTA H/N  [] Continue warfarin   [] Opthalmology  [] Gradual Normotension   [] Continue home Atorvastatin 20MG QHS  [] HbA1C and Lipid Panel    Case discussed with stroke fellow Dr. Thornton undersupervision of stroke attending Dr. Mckeon. 54y (1970) woman with a PMHx significant for Mechanical valve replacement 2000 (warfarin), Stroke left eye (2008 residual vision loss), HLD (lipitor 20) HTN, DM2 presenting as code stroke for right eye blurry vision since yesterday. Patient visiting from [place] and landed yesterday around 2 pm. She reported blurry vision to entire right visual field. Vision loss was persistent till this morning. On exam, left eye baseline left eye nasal visual field worse than temporal, right eye blurry vision throughout-20/100. CTH no acute findings 6 mm calcification involves the right parietal cortex,  2.8 cm CSF density cystic focus involves the right lateral temporal possibly cystic encephalomalacia. INR 1.76.     Not Tenecteplase candidate due to out of window      Impression: Fluctuating right eye blurry vision possibly due to hyperglycemia vs hypertension vs possible stroke vs primary opthalmologic condition     Recommendation:  [] Follow final CTA H/N  [] Continue warfarin   [] Opthalmology  [] Gradual Normotension   [] Continue home Atorvastatin 20MG QHS  [] HbA1C and Lipid Panel    Case discussed with stroke fellow Dr. Thornton undersupervision of stroke attending Dr. Mckeon. 54y (1970) woman with a PMHx significant for Mechanical valve replacement 2000 (warfarin), Stroke left eye (2008 residual vision loss), HLD (lipitor 20) HTN, DM2 presenting as code stroke for right eye blurry vision since yesterday. Patient visiting from [place] and landed yesterday around 2 pm. She reported blurry vision to entire right visual field. Vision loss was persistent till this morning. On exam, left eye baseline left eye nasal visual field worse than temporal, right eye blurry vision throughout-20/100. CTH no acute findings 6 mm calcification involves the right parietal cortex,  2.8 cm CSF density cystic focus involves the right lateral temporal possibly cystic encephalomalacia. CTP No brain parenchymal perfusion defects. CTA No evidence of significant stenosis or occlusion. INR 1.76.     Not Tenecteplase candidate due to out of window      Impression: Fluctuating right eye blurry vision possibly due to hyperglycemia vs hypertension vs possible stroke with subtherapeutic INR vs primary opthalmologic condition     Recommendation:  [x] CTH and CTA reviewed   [] Continue warfarin   [] Opthalmology  [] Gradual Normotension   [] Continue home Atorvastatin 20MG QHS  [] HbA1C and Lipid Panel    Case discussed with stroke fellow Dr. Thornton undersupervision of stroke attending Dr. Mckeon. 54y (1970) woman with a PMHx significant for Mechanical valve replacement 2000 (warfarin), Stroke left eye (2008 residual vision loss), HLD (lipitor 20) HTN, DM2 presenting as code stroke for right eye blurry vision since yesterday. Patient visiting from [place] and landed yesterday around 2 pm. She reported blurry vision to entire right visual field. Vision loss was persistent till this morning. On exam, left eye baseline left eye nasal visual field worse than temporal, right eye blurry vision throughout-20/100. CTH no acute findings 6 mm calcification involves the right parietal cortex,  2.8 cm CSF density cystic focus involves the right lateral temporal possibly cystic encephalomalacia. CTP No brain parenchymal perfusion defects. CTA No evidence of significant stenosis or occlusion. INR 1.76.     Not Tenecteplase candidate due to out of window  Not thrombectomy candidate due to no LVO    Impression: Fluctuating right eye blurry vision possibly due to hyperglycemia vs hypertension vs possible stroke with subtherapeutic INR vs primary opthalmologic condition     Recommendation:  [x] CTH and CTA reviewed   [] Continue warfarin   [] Opthalmology  [] Gradual Normotension   [] Continue home Atorvastatin 20MG QHS  [] HbA1C and Lipid Panel    Case discussed with stroke fellow Dr. Thornton undersupervision of stroke attending Dr. Mckeon. 54y (1970) woman with a PMHx significant for Mechanical valve replacement 2000 (warfarin), Stroke left eye (2008 residual vision loss), HLD (lipitor 20) HTN, DM2 presenting as code stroke for right eye blurry vision since yesterday. Patient visiting from [place] and landed yesterday around 2 pm. She reported blurry vision to entire right visual field. Vision loss was persistent till this morning. On exam, left eye baseline left eye nasal visual field worse than temporal, right eye blurry vision throughout-20/100. CTH no acute findings 6 mm calcification involves the right parietal cortex,  2.8 cm CSF density cystic focus involves the right lateral temporal possibly cystic encephalomalacia. CTP No brain parenchymal perfusion defects. CTA No evidence of significant stenosis or occlusion. INR 1.76.     Not Tenecteplase candidate due to out of window  Not thrombectomy candidate due to no LVO    Impression: Fluctuating right eye blurry vision possibly due to hyperglycemia vs hypertension vs possible stroke with subtherapeutic INR vs primary opthalmologic condition     Recommendation:  [x] CTH and CTA reviewed   [] Continue warfarin   [] Opthalmology  [] Gradual Normotension   [] Continue home Atorvastatin 20MG QHS  [] HbA1C and Lipid Panel    Case discussed with stroke fellow Dr. Thornton undersupervision of stroke attending Dr. Mckeon.  Discussed with Dr. Boykin. 54y (1970) woman with a PMHx significant for Mechanical valve replacement 2000 (warfarin), Stroke left eye (2008 residual vision loss), HLD (lipitor 20) HTN, DM2 presenting as code stroke for right eye blurry vision since yesterday. Patient visiting from [place] and landed yesterday around 2 pm. She reported blurry vision to entire right visual field. Vision loss was persistent till this morning. On exam, left eye baseline left eye nasal visual field worse than temporal, right eye blurry vision throughout-20/100. CTH no acute findings 6 mm calcification involves the right parietal cortex,  2.8 cm CSF density cystic focus involves the right lateral temporal possibly cystic encephalomalacia. CTP No brain parenchymal perfusion defects. CTA No evidence of significant stenosis or occlusion. INR 1.76.     Not Tenecteplase candidate due to out of window  Not thrombectomy candidate due to no LVO    Impression: Fluctuating right eye blurry vision possibly due to hyperglycemia vs hypertension vs possible stroke with subtherapeutic INR vs primary opthalmologic condition     Recommendation:  [x] CTH and CTA reviewed   [] Continue warfarin   [] Opthalmology  [] Gradual Normotension   [] Continue home Atorvastatin 20MG QHS  [] HbA1C and Lipid Panel    Case discussed with stroke fellow Dr. Thornton undersupervision of stroke attending Dr. Mckeon.  Spoke with Dr. Ashutosh NEWMAN.